# Patient Record
Sex: MALE | Race: BLACK OR AFRICAN AMERICAN | NOT HISPANIC OR LATINO | URBAN - METROPOLITAN AREA
[De-identification: names, ages, dates, MRNs, and addresses within clinical notes are randomized per-mention and may not be internally consistent; named-entity substitution may affect disease eponyms.]

---

## 2022-06-28 ENCOUNTER — TELEPHONE (OUTPATIENT)
Dept: ADMINISTRATIVE | Facility: HOSPITAL | Age: 37
End: 2022-06-28

## 2022-06-28 DIAGNOSIS — I62.03 CHRONIC SUBDURAL HEMATOMA: Primary | ICD-10-CM

## 2022-06-29 NOTE — TELEPHONE ENCOUNTER
Stacy.  I put an order for CT head in the chart.  Arrange for him to get that this week at [facility].  I will call him with the results.

## 2022-07-01 NOTE — TELEPHONE ENCOUNTER
Received fax from [facility] requesting more clinical notes on the pt in order to make a determination on whether the CT will be approved or denied. Notes from his initial hosp visit, op notes, office notes and imaging were faxed back to fax # [phone].

## 2022-07-28 NOTE — TELEPHONE ENCOUNTER
Not only is there a change in symptoms, but he missed a follow up appointment with CT head that was supposed to be at 6 weeks post op.

## 2022-07-28 NOTE — TELEPHONE ENCOUNTER
"There is a denial in the faxes dated 7/12.  Peer to peer needed to be done within 3 days of receipt.  I am not sure if it is better to do an appeal or start over completely.  Denial says:  " your doctor said you had a prior brain problem.  To review for approval, [facility] requires the following information: doctor's notes telling us about this problem such as how long you have had it and how it is getting worse.  We need to know why this test is needed at this time and how it will change your treatment.  The records we got did not show this."  I am not sure if any documentation was sent about the new headaches or if they would just like for him to be seen again before approving the CT.  Based on documentation in the chart, I am thinking they did not get any info on the new/recurrent symptoms.  Is it possible to start a new request online?  "

## 2022-07-28 NOTE — TELEPHONE ENCOUNTER
Please check on the status.  He needs follow up CT head wo.  I need to do peer to peer if they will not approve.  Does not need MRI.

## 2022-07-29 NOTE — TELEPHONE ENCOUNTER
Received approval.      AUTH# [id number]  7/28-8/27/22    Called patient to get his scheduled at [facility]. No answer - LVM

## 2022-08-02 NOTE — TELEPHONE ENCOUNTER
Spoke with the patient.  He requested to be scheduled for the CT on Friday due to not being able to come in until Friday.  He is scheduled at [facility] on 8/5/22 at 8:00am.  Imaging will be reviewed once completed.  Penny martinez review.

## 2022-08-10 ENCOUNTER — TELEPHONE (OUTPATIENT)
Dept: NEUROSURGERY | Facility: CLINIC | Age: 37
End: 2022-08-10
Payer: MEDICAID

## 2023-07-20 ENCOUNTER — TELEPHONE (OUTPATIENT)
Dept: NEUROSURGERY | Facility: CLINIC | Age: 38
End: 2023-07-20
Payer: MEDICAID

## 2023-07-20 DIAGNOSIS — R51.9 ACUTE INTRACTABLE HEADACHE, UNSPECIFIED HEADACHE TYPE: Primary | ICD-10-CM

## 2023-07-20 NOTE — TELEPHONE ENCOUNTER
We will need to move forward with an updated CT head and have the patient scheduled to come in for results sooner rather than later. Thanks

## 2023-07-21 NOTE — TELEPHONE ENCOUNTER
Tried to call patient to advise him of Cindy's recommendations, no answer and I was unable to leave a vm.

## 2023-07-25 NOTE — TELEPHONE ENCOUNTER
Tried to call patient on both numbers, no answer and I was unable to leave a vm. Will try one more time

## 2023-07-27 NOTE — TELEPHONE ENCOUNTER
Tried to call patient again, no answer and was unable to leave a vm. I will try his support contact. Spoke with his wife. I did advise of Cindy's recommendations. She verbalized understanding. I scheduled the CT head for 8/7/23 at 12:15 at [facility]. Scheduled his f/u w Cindy for 8/15/23 at 1:00. Patient has not had any recent testing since the last time he seen us. He has seen Dr. Gene Escamilla in [place] for this and denies any conservative treatments at this time. I did advise her that I will need to try to get OV notes from Dr. Escamilla so I will e-mail release form and she will e-mail it back.

## 2023-08-14 NOTE — TELEPHONE ENCOUNTER
----- Message from Kate Barker MA sent at 8/2/2022  9:09 AM CDT -----  Regarding: IMAGING REVIEW  Patient is scheduled at [facility] on 8/5 at 8:00am.  Penny to review once completed.    
Called patient to see why he was a no show.  No answer- [facility]  
Checked the system. Patient was a no show for the scan.  Need to contact the patient.   
Checked to see if the patient completed the CT on 8/12/22.  According to the notes, the patients girlfriend rescheduled the CT to 8/26/22.  
Patients girlfriend called back stating that she wasn't able to bring him because she had to work.      She informed me that the patients provider has changed his blood pressure medications.  He hasn't been complaining much of headaches, but has been very sleepy.      I have rescheduled the patients CT to 8/12 @ 4:45pm.  Patients girlfriend stated that she would be able to bring him to that appointment.   
Penny,  the patient finally completed his CT scan.  The patients girlfriend previously stated that he has been very sleepy and is concerned.  Please review and advise of future treatment plan.  
Please let his girlfriend know that the CT looks good.  There is no subdural fluid.  That is not the problem for his symptoms.  No follow up needed.  
Spoke with the patients girlfriend.  She is aware of the image results and Magi recommendations.   
No

## 2023-08-14 NOTE — TELEPHONE ENCOUNTER
I am not seeing that patient did his CT prior to his appointment tomorrow. Its looking like FREDERICK could not get in touch w the patient. I tried to call his wife to inquire about this, no answer so I LMOM.

## 2024-06-13 ENCOUNTER — OFFICE VISIT (OUTPATIENT)
Dept: FAMILY MEDICINE | Facility: CLINIC | Age: 39
End: 2024-06-13
Payer: MEDICAID

## 2024-06-13 VITALS
BODY MASS INDEX: 30.25 KG/M2 | DIASTOLIC BLOOD PRESSURE: 102 MMHG | HEART RATE: 89 BPM | SYSTOLIC BLOOD PRESSURE: 142 MMHG | RESPIRATION RATE: 20 BRPM | WEIGHT: 223.31 LBS | TEMPERATURE: 99 F | HEIGHT: 72 IN | OXYGEN SATURATION: 99 %

## 2024-06-13 DIAGNOSIS — R76.8 HEPATITIS C ANTIBODY POSITIVE IN BLOOD: ICD-10-CM

## 2024-06-13 DIAGNOSIS — Z00.00 ENCOUNTER FOR WELLNESS EXAMINATION: Primary | ICD-10-CM

## 2024-06-13 DIAGNOSIS — M25.512 CHRONIC LEFT SHOULDER PAIN: ICD-10-CM

## 2024-06-13 DIAGNOSIS — I10 PRIMARY HYPERTENSION: ICD-10-CM

## 2024-06-13 DIAGNOSIS — G89.29 CHRONIC LEFT SHOULDER PAIN: ICD-10-CM

## 2024-06-13 DIAGNOSIS — Z72.0 TOBACCO ABUSE: ICD-10-CM

## 2024-06-13 LAB
ALBUMIN SERPL-MCNC: 3.9 G/DL (ref 3.5–5)
ALBUMIN/GLOB SERPL: 1.3 RATIO (ref 1.1–2)
ALP SERPL-CCNC: 57 UNIT/L (ref 40–150)
ALT SERPL-CCNC: 15 UNIT/L (ref 0–55)
ANION GAP SERPL CALC-SCNC: 7 MEQ/L
AST SERPL-CCNC: 21 UNIT/L (ref 5–34)
BACTERIA #/AREA URNS AUTO: ABNORMAL /HPF
BASOPHILS # BLD AUTO: 0.06 X10(3)/MCL
BASOPHILS NFR BLD AUTO: 1 %
BILIRUB SERPL-MCNC: 0.6 MG/DL
BILIRUB UR QL STRIP.AUTO: NEGATIVE
BUN SERPL-MCNC: 12.1 MG/DL (ref 8.9–20.6)
CALCIUM SERPL-MCNC: 9.3 MG/DL (ref 8.4–10.2)
CHLORIDE SERPL-SCNC: 111 MMOL/L (ref 98–107)
CHOLEST SERPL-MCNC: 185 MG/DL
CHOLEST/HDLC SERPL: 4 {RATIO} (ref 0–5)
CLARITY UR: CLEAR
CO2 SERPL-SCNC: 22 MMOL/L (ref 22–29)
COLOR UR AUTO: ABNORMAL
CREAT SERPL-MCNC: 1.39 MG/DL (ref 0.73–1.18)
CREAT/UREA NIT SERPL: 9
EOSINOPHIL # BLD AUTO: 0.16 X10(3)/MCL (ref 0–0.9)
EOSINOPHIL NFR BLD AUTO: 2.6 %
ERYTHROCYTE [DISTWIDTH] IN BLOOD BY AUTOMATED COUNT: 12.5 % (ref 11.5–17)
EST. AVERAGE GLUCOSE BLD GHB EST-MCNC: 102.5 MG/DL
GFR SERPLBLD CREATININE-BSD FMLA CKD-EPI: >60 ML/MIN/1.73/M2
GLOBULIN SER-MCNC: 3 GM/DL (ref 2.4–3.5)
GLUCOSE SERPL-MCNC: 78 MG/DL (ref 74–100)
GLUCOSE UR QL STRIP: NORMAL
HAV IGM SERPL QL IA: NONREACTIVE
HBA1C MFR BLD: 5.2 %
HBV CORE IGM SERPL QL IA: NONREACTIVE
HBV SURFACE AG SERPL QL IA: NONREACTIVE
HCT VFR BLD AUTO: 41.2 % (ref 42–52)
HCV AB SERPL QL IA: REACTIVE
HDLC SERPL-MCNC: 48 MG/DL (ref 35–60)
HGB BLD-MCNC: 13.8 G/DL (ref 14–18)
HGB UR QL STRIP: NEGATIVE
HIV 1+2 AB+HIV1 P24 AG SERPL QL IA: NONREACTIVE
HYALINE CASTS #/AREA URNS LPF: ABNORMAL /LPF
IMM GRANULOCYTES # BLD AUTO: 0.01 X10(3)/MCL (ref 0–0.04)
IMM GRANULOCYTES NFR BLD AUTO: 0.2 %
KETONES UR QL STRIP: NEGATIVE
LDLC SERPL CALC-MCNC: 122 MG/DL (ref 50–140)
LEUKOCYTE ESTERASE UR QL STRIP: NEGATIVE
LYMPHOCYTES # BLD AUTO: 2.92 X10(3)/MCL (ref 0.6–4.6)
LYMPHOCYTES NFR BLD AUTO: 47.4 %
MCH RBC QN AUTO: 31.3 PG (ref 27–31)
MCHC RBC AUTO-ENTMCNC: 33.5 G/DL (ref 33–36)
MCV RBC AUTO: 93.4 FL (ref 80–94)
MONOCYTES # BLD AUTO: 0.58 X10(3)/MCL (ref 0.1–1.3)
MONOCYTES NFR BLD AUTO: 9.4 %
MUCOUS THREADS URNS QL MICRO: ABNORMAL /LPF
NEUTROPHILS # BLD AUTO: 2.43 X10(3)/MCL (ref 2.1–9.2)
NEUTROPHILS NFR BLD AUTO: 39.4 %
NITRITE UR QL STRIP: NEGATIVE
NRBC BLD AUTO-RTO: 0 %
PH UR STRIP: 5.5 [PH]
PLATELET # BLD AUTO: 219 X10(3)/MCL (ref 130–400)
PMV BLD AUTO: 10.1 FL (ref 7.4–10.4)
POTASSIUM SERPL-SCNC: 4 MMOL/L (ref 3.5–5.1)
PROT SERPL-MCNC: 6.9 GM/DL (ref 6.4–8.3)
PROT UR QL STRIP: NEGATIVE
RBC # BLD AUTO: 4.41 X10(6)/MCL (ref 4.7–6.1)
RBC #/AREA URNS AUTO: ABNORMAL /HPF
SODIUM SERPL-SCNC: 140 MMOL/L (ref 136–145)
SP GR UR STRIP.AUTO: 1.03 (ref 1–1.03)
SQUAMOUS #/AREA URNS LPF: ABNORMAL /HPF
T PALLIDUM AB SER QL: NONREACTIVE
T4 FREE SERPL-MCNC: 0.88 NG/DL (ref 0.7–1.48)
TRIGL SERPL-MCNC: 75 MG/DL (ref 34–140)
TSH SERPL-ACNC: 1.17 UIU/ML (ref 0.35–4.94)
UROBILINOGEN UR STRIP-ACNC: NORMAL
VLDLC SERPL CALC-MCNC: 15 MG/DL
WBC # SPEC AUTO: 6.16 X10(3)/MCL (ref 4.5–11.5)
WBC #/AREA URNS AUTO: ABNORMAL /HPF

## 2024-06-13 PROCEDURE — 85025 COMPLETE CBC W/AUTO DIFF WBC: CPT

## 2024-06-13 PROCEDURE — 87522 HEPATITIS C REVRS TRNSCRPJ: CPT

## 2024-06-13 PROCEDURE — 3044F HG A1C LEVEL LT 7.0%: CPT | Mod: CPTII,,,

## 2024-06-13 PROCEDURE — 80061 LIPID PANEL: CPT

## 2024-06-13 PROCEDURE — 84439 ASSAY OF FREE THYROXINE: CPT

## 2024-06-13 PROCEDURE — 3077F SYST BP >= 140 MM HG: CPT | Mod: CPTII,,,

## 2024-06-13 PROCEDURE — 1159F MED LIST DOCD IN RCRD: CPT | Mod: CPTII,,,

## 2024-06-13 PROCEDURE — 99406 BEHAV CHNG SMOKING 3-10 MIN: CPT | Mod: S$PBB,,,

## 2024-06-13 PROCEDURE — 80053 COMPREHEN METABOLIC PANEL: CPT

## 2024-06-13 PROCEDURE — 83036 HEMOGLOBIN GLYCOSYLATED A1C: CPT

## 2024-06-13 PROCEDURE — 99385 PREV VISIT NEW AGE 18-39: CPT | Mod: S$PBB,25,,

## 2024-06-13 PROCEDURE — 87389 HIV-1 AG W/HIV-1&-2 AB AG IA: CPT

## 2024-06-13 PROCEDURE — 4010F ACE/ARB THERAPY RXD/TAKEN: CPT | Mod: CPTII,,,

## 2024-06-13 PROCEDURE — 1160F RVW MEDS BY RX/DR IN RCRD: CPT | Mod: CPTII,,,

## 2024-06-13 PROCEDURE — 3080F DIAST BP >= 90 MM HG: CPT | Mod: CPTII,,,

## 2024-06-13 PROCEDURE — 99204 OFFICE O/P NEW MOD 45 MIN: CPT | Mod: 25,S$PBB,,

## 2024-06-13 PROCEDURE — 80074 ACUTE HEPATITIS PANEL: CPT

## 2024-06-13 PROCEDURE — 36415 COLL VENOUS BLD VENIPUNCTURE: CPT

## 2024-06-13 PROCEDURE — 3008F BODY MASS INDEX DOCD: CPT | Mod: CPTII,,,

## 2024-06-13 PROCEDURE — 81001 URINALYSIS AUTO W/SCOPE: CPT

## 2024-06-13 PROCEDURE — 86780 TREPONEMA PALLIDUM: CPT

## 2024-06-13 PROCEDURE — 99214 OFFICE O/P EST MOD 30 MIN: CPT | Mod: PBBFAC,PN

## 2024-06-13 PROCEDURE — 84443 ASSAY THYROID STIM HORMONE: CPT

## 2024-06-13 RX ORDER — DICLOFENAC SODIUM 75 MG/1
75 TABLET, DELAYED RELEASE ORAL 2 TIMES DAILY PRN
Qty: 60 TABLET | Refills: 1 | Status: SHIPPED | OUTPATIENT
Start: 2024-06-13 | End: 2024-09-11

## 2024-06-13 RX ORDER — AMLODIPINE BESYLATE 10 MG/1
10 TABLET ORAL DAILY
COMMUNITY
Start: 2022-04-02 | End: 2024-06-13 | Stop reason: SDUPTHER

## 2024-06-13 RX ORDER — AMLODIPINE BESYLATE 10 MG/1
10 TABLET ORAL DAILY
Qty: 90 TABLET | Refills: 3 | Status: SHIPPED | OUTPATIENT
Start: 2024-06-13 | End: 2025-06-13

## 2024-06-13 RX ORDER — LISINOPRIL 10 MG/1
10 TABLET ORAL DAILY
Qty: 90 TABLET | Refills: 0 | Status: SHIPPED | OUTPATIENT
Start: 2024-06-13 | End: 2024-09-11

## 2024-06-13 RX ORDER — METHOCARBAMOL 500 MG/1
500 TABLET, FILM COATED ORAL 4 TIMES DAILY PRN
Qty: 40 TABLET | Refills: 0 | Status: SHIPPED | OUTPATIENT
Start: 2024-06-13 | End: 2024-06-23

## 2024-06-13 RX ORDER — LISINOPRIL 10 MG/1
10 TABLET ORAL DAILY
COMMUNITY
Start: 2022-01-06 | End: 2024-06-13 | Stop reason: SDUPTHER

## 2024-06-13 NOTE — PROGRESS NOTES
Patient Name: Valeriano Fountain     : 1985    MRN: [id number]     Subjective:     Patient ID: Valeriano Fountain is a 38 y.o. male.    Chief Complaint:   Chief Complaint   Patient presents with    Establish Care     New patient. Establish care. Requesting med refills. States ran out about one month ago. C/o left should discomfort. Limited ROM. No injury reported.         HPI: 2024: Patient presents to clinic today to establish care, patient has previously been diagnoses with with HTN, hypertensive today in clinic. Denies HA, blurred vision, chest pain, SOB, palpitations or edema. Has not been on medication for many months. Patient denies fever, night sweats, chills, nausea, vomiting, diarrhea, constipation, weight loss, and changes in appetite.  Wellness labs (CBC, CMP, A1c, FLP, TSH, Free T4) ordered for patient today. They will return to clinic to review these labs.      Also complaining of chronic left shoulder pain, states no changes in sensation or strength, does notice pain only with movement. Able to carry out job duties currently. No known injury or prior imaging.           ROS:      12 point review of systems conducted, negative except as stated in the history of present illness. See HPI for details.    History:     Past Medical History:   Diagnosis Date    Headache     Hypertension     Leukocytosis     Traumatic subdural hemorrhage         Past Surgical History:   Procedure Laterality Date    CRANIOTOMY         No family history on file.     Social History     Tobacco Use    Smoking status: Every Day     Current packs/day: 0.50     Types: Cigarettes     Passive exposure: Never    Smokeless tobacco: Never   Substance and Sexual Activity    Alcohol use: Not Currently    Drug use: Not Currently    Sexual activity: Yes     Partners: Female       Current Outpatient Medications   Medication Instructions    amLODIPine (NORVASC) 10 mg, Oral, Daily    diclofenac (VOLTAREN) 75 mg, Oral, 2 times daily PRN     lisinopriL 10 mg, Oral, Daily    methocarbamoL (ROBAXIN) 500 mg, Oral, 4 times daily PRN        Review of patient's allergies indicates:   Allergen Reactions    Penicillin Other (See Comments)       Objective:     Visit Vitals  BP (!) 142/102 (BP Location: Right arm, Patient Position: Sitting)   Pulse 89   Temp 98.6 °F (37 °C) (Oral)   Resp 20   Ht 6' (1.829 m)   Wt 101.3 kg (223 lb 4.8 oz)   SpO2 99%   BMI 30.28 kg/m²       Physical Examination:     Physical Exam  Constitutional:       General: He is not in acute distress.     Appearance: Normal appearance. He is not ill-appearing.   Cardiovascular:      Rate and Rhythm: Normal rate and regular rhythm.      Heart sounds: Normal heart sounds.   Pulmonary:      Effort: Pulmonary effort is normal. No respiratory distress.      Breath sounds: Normal breath sounds.   Musculoskeletal:      Left shoulder: Decreased range of motion.      Cervical back: Normal range of motion.   Skin:     General: Skin is warm and dry.   Neurological:      Mental Status: He is alert and oriented to person, place, and time.   Psychiatric:         Mood and Affect: Mood normal.         Behavior: Behavior normal.         Lab Results:     Chemistry:  Lab Results   Component Value Date     06/13/2024    K 4.0 06/13/2024    BUN 12.1 06/13/2024    CREATININE 1.39 (H) 06/13/2024    EGFRNORACEVR >60 06/13/2024    GLUCOSE 78 06/13/2024    CALCIUM 9.3 06/13/2024    ALKPHOS 57 06/13/2024    LABPROT 6.9 06/13/2024    ALBUMIN 3.9 06/13/2024    BILIDIR 0.2 01/06/2022    IBILI 0.40 01/06/2022    AST 21 06/13/2024    ALT 15 06/13/2024    TSH 1.172 06/13/2024    EJPWUS1VPII 0.88 06/13/2024        Lab Results   Component Value Date    HGBA1C 5.2 06/13/2024        Hematology:  Lab Results   Component Value Date    WBC 6.16 06/13/2024    HGB 13.8 (L) 06/13/2024    HCT 41.2 (L) 06/13/2024     06/13/2024       Lipid Panel:  Lab Results   Component Value Date    CHOL 185 06/13/2024    HDL 48 06/13/2024     .00 06/13/2024    TRIG 75 06/13/2024    TOTALCHOLEST 4 06/13/2024        Urine:  Lab Results   Component Value Date    APPEARANCEUA Clear 06/13/2024    SGUA 1.026 06/13/2024    PROTEINUA Negative 06/13/2024    KETONESUA Negative 06/13/2024    LEUKOCYTESUR Negative 06/13/2024    RBCUA 0-5 06/13/2024    WBCUA 0-5 06/13/2024    BACTERIA None Seen 06/13/2024    SQEPUA Trace (A) 06/13/2024    HYALINECASTS None Seen 06/13/2024        Assessment:          ICD-10-CM ICD-9-CM   1. Encounter for wellness examination  Z00.00 V70.0   2. Primary hypertension  I10 401.9   3. Chronic left shoulder pain  M25.512 719.41    G89.29 338.29   4. Hepatitis C antibody positive in blood  R76.8 795.79   5. Tobacco abuse  Z72.0 305.1        Plan:     1. Encounter for wellness examination  -     TSH  -     T4, Free  -     Hemoglobin A1C  -     SYPHILIS ANTIBODY (WITH REFLEX RPR)  -     Hepatitis Panel, Acute  -     Lipid Panel  -     CBC Auto Differential  -     Comprehensive Metabolic Panel  -     HIV 1/2 Ag/Ab (4th Gen)  -     Urinalysis, Reflex to Urine Culture    2. Primary hypertension  Assessment & Plan:  Low Sodium Diet (Dash Diet - less than 2 grams of sodium per day).  Monitor Blood Pressure daily and log. Report any consistent numbers greater than 140/90.  Smoking Cessation encouraged to aid in BP reduction.  Maintain healthy weight with goal BMI <30. Exercise 30 minutes per day 5 days per week      New chronic condition, start amlodipine 10 mg daily, lisinopril 10 mg daily.    Orders:  -     amLODIPine (NORVASC) 10 MG tablet; Take 1 tablet (10 mg total) by mouth once daily.  Dispense: 90 tablet; Refill: 3  -     lisinopriL 10 MG tablet; Take 1 tablet (10 mg total) by mouth once daily.  Dispense: 90 tablet; Refill: 0    3. Chronic left shoulder pain  Comments:  XR ordered today, will discuss PT versus ortho once obtained.  Orders:  -     X-Ray Shoulder 2 or More Views Left; Future; Expected date: 06/13/2024  -      diclofenac (VOLTAREN) 75 MG EC tablet; Take 1 tablet (75 mg total) by mouth 2 (two) times daily as needed (pain).  Dispense: 60 tablet; Refill: 1  -     methocarbamoL (ROBAXIN) 500 MG Tab; Take 1 tablet (500 mg total) by mouth 4 (four) times daily as needed (spasm).  Dispense: 40 tablet; Refill: 0    4. Hepatitis C antibody positive in blood  -     Hepatitis C Virus Quantitative; Future; Expected date: 06/13/2024    5. Tobacco abuse  Assessment & Plan:  Encouraged smoking cessation.  Smoking cessation discussed for 5 minutes.  Discussed benefits of quitting including improved health, decreased cardiac/vascular/pulmonary/stroke risks as well as saving money             Follow up in about 2 weeks (around 6/27/2024) for BP recheck, review labs, shoulder pain follow up.    Future Appointments   Date Time Provider Department Center   7/16/2024 12:00 PM Soco Sorensen NP [facility]        Soco Sorensen NP

## 2024-06-13 NOTE — ASSESSMENT & PLAN NOTE
Low Sodium Diet (Dash Diet - less than 2 grams of sodium per day).  Monitor Blood Pressure daily and log. Report any consistent numbers greater than 140/90.  Smoking Cessation encouraged to aid in BP reduction.  Maintain healthy weight with goal BMI <30. Exercise 30 minutes per day 5 days per week      New chronic condition, start amlodipine 10 mg daily, lisinopril 10 mg daily.

## 2024-06-14 ENCOUNTER — TELEPHONE (OUTPATIENT)
Dept: FAMILY MEDICINE | Facility: CLINIC | Age: 39
End: 2024-06-14
Payer: MEDICAID

## 2024-06-14 NOTE — TELEPHONE ENCOUNTER
Called and spoke with [facility] lab. Information given. Lab to contact clinic with any questions or concerns.

## 2024-06-14 NOTE — TELEPHONE ENCOUNTER
----- Message from Soco Sorensen NP sent at 6/13/2024  9:39 PM CDT -----  Can we ask lab to run the hep C quant on this patient, I added it for him. Thanks!!

## 2024-06-16 LAB — HCV RNA SERPL NAA+PROBE-ACNC: NORMAL IU/ML

## 2025-07-10 ENCOUNTER — TELEPHONE (OUTPATIENT)
Dept: INTERNAL MEDICINE | Facility: CLINIC | Age: 40
End: 2025-07-10
Payer: MEDICAID

## 2025-07-10 NOTE — TELEPHONE ENCOUNTER
Spoke to Salvador  she informed me that his ID / drivers licence is  will call to schedule appointment once obtained.

## 2025-07-10 NOTE — TELEPHONE ENCOUNTER
Copied from CRM #[id number]. Topic: Appointments - Information Request  >> Jul 3, 2025 10:26 AM Dianna wrote:  Who Called: Omar    Caller is requesting assistance/information from provider's office.    Symptoms (please be specific):   How long has patient had these symptoms:    List of preferred pharmacies on file (remove unneeded): [unfilled]  If different, enter pharmacy into here including location and phone number:       Preferred Method of Contact: Phone Call  Patient's Preferred Phone Number on File: [phone]   Best Call Back Number, if different:[phone]  Additional Information: omar pt spouse stated patient doesn't have ID card , can he still come to appt. Please advise.